# Patient Record
Sex: MALE | Race: NATIVE HAWAIIAN OR OTHER PACIFIC ISLANDER
[De-identification: names, ages, dates, MRNs, and addresses within clinical notes are randomized per-mention and may not be internally consistent; named-entity substitution may affect disease eponyms.]

---

## 2018-03-04 ENCOUNTER — HOSPITAL ENCOUNTER (OUTPATIENT)
Dept: HOSPITAL 17 - NEPC | Age: 53
Setting detail: OBSERVATION
LOS: 1 days | Discharge: HOME | End: 2018-03-05
Attending: HOSPITALIST | Admitting: HOSPITALIST
Payer: MEDICAID

## 2018-03-04 VITALS
HEART RATE: 77 BPM | RESPIRATION RATE: 18 BRPM | OXYGEN SATURATION: 99 % | TEMPERATURE: 98.3 F | DIASTOLIC BLOOD PRESSURE: 74 MMHG | SYSTOLIC BLOOD PRESSURE: 117 MMHG

## 2018-03-04 VITALS — HEIGHT: 70 IN | BODY MASS INDEX: 31.56 KG/M2 | WEIGHT: 220.46 LBS

## 2018-03-04 VITALS
SYSTOLIC BLOOD PRESSURE: 123 MMHG | OXYGEN SATURATION: 99 % | DIASTOLIC BLOOD PRESSURE: 72 MMHG | HEART RATE: 112 BPM | RESPIRATION RATE: 20 BRPM

## 2018-03-04 VITALS
OXYGEN SATURATION: 100 % | RESPIRATION RATE: 17 BRPM | TEMPERATURE: 98.9 F | SYSTOLIC BLOOD PRESSURE: 121 MMHG | HEART RATE: 131 BPM | DIASTOLIC BLOOD PRESSURE: 69 MMHG

## 2018-03-04 VITALS
DIASTOLIC BLOOD PRESSURE: 72 MMHG | OXYGEN SATURATION: 98 % | HEART RATE: 102 BPM | RESPIRATION RATE: 18 BRPM | SYSTOLIC BLOOD PRESSURE: 113 MMHG

## 2018-03-04 DIAGNOSIS — K29.70: Primary | ICD-10-CM

## 2018-03-04 DIAGNOSIS — E86.1: ICD-10-CM

## 2018-03-04 DIAGNOSIS — K57.90: ICD-10-CM

## 2018-03-04 DIAGNOSIS — N28.1: ICD-10-CM

## 2018-03-04 DIAGNOSIS — R42: ICD-10-CM

## 2018-03-04 DIAGNOSIS — D72.829: ICD-10-CM

## 2018-03-04 DIAGNOSIS — D64.9: ICD-10-CM

## 2018-03-04 DIAGNOSIS — R94.31: ICD-10-CM

## 2018-03-04 DIAGNOSIS — Z87.11: ICD-10-CM

## 2018-03-04 DIAGNOSIS — E86.0: ICD-10-CM

## 2018-03-04 LAB
ALBUMIN SERPL-MCNC: 3.5 GM/DL (ref 3.4–5)
ALP SERPL-CCNC: 50 U/L (ref 45–117)
ALT SERPL-CCNC: 17 U/L (ref 12–78)
AST SERPL-CCNC: 14 U/L (ref 15–37)
BASOPHILS # BLD AUTO: 0 TH/MM3 (ref 0–0.2)
BASOPHILS NFR BLD: 0.1 % (ref 0–2)
BILIRUB SERPL-MCNC: 0.2 MG/DL (ref 0.2–1)
BUN SERPL-MCNC: 36 MG/DL (ref 7–18)
CALCIUM SERPL-MCNC: 8.6 MG/DL (ref 8.5–10.1)
CHLORIDE SERPL-SCNC: 109 MEQ/L (ref 98–107)
CREAT SERPL-MCNC: 1.02 MG/DL (ref 0.6–1.3)
EOSINOPHIL # BLD: 0 TH/MM3 (ref 0–0.4)
EOSINOPHIL NFR BLD: 0.1 % (ref 0–4)
ERYTHROCYTE [DISTWIDTH] IN BLOOD BY AUTOMATED COUNT: 12.7 % (ref 11.6–17.2)
GFR SERPLBLD BASED ON 1.73 SQ M-ARVRAT: 76 ML/MIN (ref 89–?)
GLUCOSE SERPL-MCNC: 174 MG/DL (ref 74–106)
HCO3 BLD-SCNC: 28.2 MEQ/L (ref 21–32)
HCT VFR BLD CALC: 34 % (ref 39–51)
HGB BLD-MCNC: 11.5 GM/DL (ref 13–17)
INR PPP: 1 RATIO
LYMPHOCYTES # BLD AUTO: 3.3 TH/MM3 (ref 1–4.8)
LYMPHOCYTES NFR BLD AUTO: 14 % (ref 9–44)
MCH RBC QN AUTO: 29.8 PG (ref 27–34)
MCHC RBC AUTO-ENTMCNC: 33.9 % (ref 32–36)
MCV RBC AUTO: 87.8 FL (ref 80–100)
MONOCYTE #: 0.9 TH/MM3 (ref 0–0.9)
MONOCYTES NFR BLD: 3.9 % (ref 0–8)
NEUTROPHILS # BLD AUTO: 19.2 TH/MM3 (ref 1.8–7.7)
NEUTROPHILS NFR BLD AUTO: 81.9 % (ref 16–70)
PLATELET # BLD: 333 TH/MM3 (ref 150–450)
PMV BLD AUTO: 9 FL (ref 7–11)
PROT SERPL-MCNC: 6.9 GM/DL (ref 6.4–8.2)
PROTHROMBIN TIME: 10.3 SEC (ref 9.8–11.6)
RBC # BLD AUTO: 3.87 MIL/MM3 (ref 4.5–5.9)
SODIUM SERPL-SCNC: 142 MEQ/L (ref 136–145)
TROPONIN I SERPL-MCNC: (no result) NG/ML (ref 0.02–0.05)
WBC # BLD AUTO: 23.4 TH/MM3 (ref 4–11)

## 2018-03-04 PROCEDURE — 96365 THER/PROPH/DIAG IV INF INIT: CPT

## 2018-03-04 PROCEDURE — 88305 TISSUE EXAM BY PATHOLOGIST: CPT

## 2018-03-04 PROCEDURE — 96366 THER/PROPH/DIAG IV INF ADDON: CPT

## 2018-03-04 PROCEDURE — 86850 RBC ANTIBODY SCREEN: CPT

## 2018-03-04 PROCEDURE — 85025 COMPLETE CBC W/AUTO DIFF WBC: CPT

## 2018-03-04 PROCEDURE — 96361 HYDRATE IV INFUSION ADD-ON: CPT

## 2018-03-04 PROCEDURE — 93005 ELECTROCARDIOGRAM TRACING: CPT

## 2018-03-04 PROCEDURE — 88312 SPECIAL STAINS GROUP 1: CPT

## 2018-03-04 PROCEDURE — 80053 COMPREHEN METABOLIC PANEL: CPT

## 2018-03-04 PROCEDURE — 74177 CT ABD & PELVIS W/CONTRAST: CPT

## 2018-03-04 PROCEDURE — 84484 ASSAY OF TROPONIN QUANT: CPT

## 2018-03-04 PROCEDURE — 87086 URINE CULTURE/COLONY COUNT: CPT

## 2018-03-04 PROCEDURE — 81001 URINALYSIS AUTO W/SCOPE: CPT

## 2018-03-04 PROCEDURE — 86900 BLOOD TYPING SEROLOGIC ABO: CPT

## 2018-03-04 PROCEDURE — 83690 ASSAY OF LIPASE: CPT

## 2018-03-04 PROCEDURE — 85730 THROMBOPLASTIN TIME PARTIAL: CPT

## 2018-03-04 PROCEDURE — 71045 X-RAY EXAM CHEST 1 VIEW: CPT

## 2018-03-04 PROCEDURE — C9113 INJ PANTOPRAZOLE SODIUM, VIA: HCPCS

## 2018-03-04 PROCEDURE — 84443 ASSAY THYROID STIM HORMONE: CPT

## 2018-03-04 PROCEDURE — 85610 PROTHROMBIN TIME: CPT

## 2018-03-04 PROCEDURE — G0378 HOSPITAL OBSERVATION PER HR: HCPCS

## 2018-03-04 PROCEDURE — 86901 BLOOD TYPING SEROLOGIC RH(D): CPT

## 2018-03-04 RX ADMIN — PANTOPRAZOLE SODIUM SCH MLS/HR: 40 INJECTION, POWDER, FOR SOLUTION INTRAVENOUS at 18:53

## 2018-03-04 RX ADMIN — PHENYTOIN SODIUM SCH MLS/HR: 50 INJECTION INTRAMUSCULAR; INTRAVENOUS at 20:53

## 2018-03-04 RX ADMIN — STANDARDIZED SENNA CONCENTRATE AND DOCUSATE SODIUM SCH TAB: 8.6; 5 TABLET, FILM COATED ORAL at 21:00

## 2018-03-04 RX ADMIN — Medication SCH ML: at 21:00

## 2018-03-04 NOTE — RADRPT
EXAM DATE/TIME:  03/04/2018 22:05 

 

[facility] COMPARISON:     

No previous studies available for comparison.

 

                     

INDICATIONS :     

Shortness of breath and dizziness.

                     

 

MEDICAL HISTORY :     

None.          

 

SURGICAL HISTORY :     

None.   

 

ENCOUNTER:     

Initial                                        

 

ACUITY:     

1 day      

 

PAIN SCORE:     

0/10

 

LOCATION:     

Bilateral chest 

 

FINDINGS:     

A single view of the chest demonstrates the lungs to be symmetrically aerated without evidence of mas
s, infiltrate or effusion.  The cardiomediastinal contours are unremarkable.  Osseous structures are 
intact.

 

CONCLUSION:     No acute disease.  

 

 

 

 Sabino Rinaldi MD on March 04, 2018 at 22:37           

Board Certified Radiologist.

 This report was verified electronically.

## 2018-03-04 NOTE — HHI.HP
__________________________________________________





HPI


Service


[facility]


Primary Care Physician


No Primary Care Physician


Admission Diagnosis





GI Bleed


Diagnoses:  


(1) GI bleed


Diagnosis:  Principal





(2) Dizziness


Diagnosis:  Principal





(3) Leukocytosis


Diagnosis:  Principal





Travel History


International Travel<30 Days:  Yes


Contact w/Intl Traveler <30 Da:  Yes


Name of Country Traveled to:  [facility]


Traveled to Known Affected Are:  No


History of Present Illness


This is a 52-year-old male with no reported PMH of present the ER with 

complaints of dizziness and black/tarry stools x2 days.  States dizziness worse 

after sitting up or with ambulation.  Notes black stool intermittently.  States 

had similar symptoms approx 2mo ago however black stool resolved on its own, 

did not seek medical attention.  Admits to taking ASA intermittently, no Pepto.

  On arrival, /69, , O2 sat 100% on RA, Afebrile.  Currently /

72, .  WBC 23.4.  Hgb 11.5.  BUN 36, GFR 76.  Troponin negative.  INR 

1.0.  CT Abdomen/Pelvis with no acute findings.  Hemoccult + on exam, started 

on Protonix gtt.





Review of Systems


Except as stated in HPI:  all other systems reviewed are Neg


ROS: 14 point review of systems otherwise negative.





Past Family Social History


Past Medical History


PMH:  None


Past Surgical History


PAST SURGICAL HISTORY:  None


Allergies:  


Coded Allergies:  


     guaifenesin (Unverified  Allergy, Severe, Rash, 3/4/18)


Family History


PAST FAMILY HISTORY:  Reviewed, positive for DM.


Social History


PAST SOCIAL HISTORY:  Negative for alcohol, tobacco or drugs.





Physical Exam


Vital Signs





Vital Signs








  Date Time  Temp Pulse Resp B/P (MAP) Pulse Ox O2 Delivery O2 Flow Rate FiO2


 


3/4/18 20:00  102 18 113/72 (86) 98 Room Air  


 


3/4/18 18:18  112 20 123/72 (89) 99 Room Air  


 


3/4/18 17:52 98.9 131 17 121/69 (86) 100   








Physical Exam


PE:


GENERAL: Very pleasant middle-aged male in no acute distress.  Wife at bedside.


HEENT: PERRLA, EOMI. No scleral icterus or conjunctival pallor. No lid lag or 

facial droop.  


CARDIOVASCULAR: Regular rate and rhythm.  No obvious murmurs to auscultation. 

No chest tenderness to palpation. 


RESPIRATORY: No obvious rhonchi or wheezing. Clear to auscultation. Breath 

sounds equal bilaterally. 


GASTROINTESTINAL: Abdomen soft, non-tender, nondistended. BS normal. 


MUSCULOSKELETAL: Extremities without clubbing, cyanosis, or edema. No obvious 

deformities. 


NEUROLOGICAL: Awake, alert and oriented x4. No focal neurologic deficits. 

Moving both upper and lower extremities spontaneously.


Laboratory





Laboratory Tests








Test


  3/4/18


18:40


 


White Blood Count 23.4 


 


Red Blood Count 3.87 


 


Hemoglobin 11.5 


 


Hematocrit 34.0 


 


Mean Corpuscular Volume 87.8 


 


Mean Corpuscular Hemoglobin 29.8 


 


Mean Corpuscular Hemoglobin


Concent 33.9 


 


 


Red Cell Distribution Width 12.7 


 


Platelet Count 333 


 


Mean Platelet Volume 9.0 


 


Neutrophils (%) (Auto) 81.9 


 


Lymphocytes (%) (Auto) 14.0 


 


Monocytes (%) (Auto) 3.9 


 


Eosinophils (%) (Auto) 0.1 


 


Basophils (%) (Auto) 0.1 


 


Neutrophils # (Auto) 19.2 


 


Lymphocytes # (Auto) 3.3 


 


Monocytes # (Auto) 0.9 


 


Eosinophils # (Auto) 0.0 


 


Basophils # (Auto) 0.0 


 


CBC Comment DIFF FINAL 


 


Differential Comment  


 


Prothrombin Time 10.3 


 


Prothromb Time International


Ratio 1.0 


 


 


Activated Partial


Thromboplast Time 21.8 


 


 


Blood Urea Nitrogen 36 


 


Creatinine 1.02 


 


Random Glucose 174 


 


Total Protein 6.9 


 


Albumin 3.5 


 


Calcium Level 8.6 


 


Alkaline Phosphatase 50 


 


Aspartate Amino Transf


(AST/SGOT) 14 


 


 


Alanine Aminotransferase


(ALT/SGPT) 17 


 


 


Total Bilirubin 0.2 


 


Sodium Level 142 


 


Potassium Level 4.1 


 


Chloride Level 109 


 


Carbon Dioxide Level 28.2 


 


Anion Gap 5 


 


Estimat Glomerular Filtration


Rate 76 


 


 


Troponin I LESS THAN 0.02 


 


Lipase 177 


 


Thyroid Stimulating Hormone


3rd Gen 1.340 


 








Result Diagram:  


3/4/18 1840                                                                    

            3/4/18 1840








Caprini VTE Risk Assessment


Caprini VTE Risk Assessment:  No/Low Risk (score <= 1)


VTE Pharm Contraindication:  Active bleeding


Caprini Risk Assessment Model











 Point Value = 1          Point Value = 2  Point Value = 3  Point Value = 5


 


Age 41-60


Minor surgery


BMI > 25 kg/m2


Swollen legs


Varicose veins


Pregnancy or postpartum


History of unexplained or recurrent


   spontaneous 


Oral contraceptives or hormone


   replacement


Sepsis (< 1 month)


Serious lung disease, including


   pneumonia (< 1 month)


Abnormal pulmonary function


Acute myocardial infarction


Congestive heart failure (< 1 month)


History of inflammatory bowel disease


Medical patient at bed rest Age 61-74


Arthroscopic surgery


Major open surgery (> 45 min)


Laparoscopic surgery (> 45 min)


Malignancy


Confined to bed (> 72 hours)


Immobilizing plaster cast


Central venous access Age >= 75


History of VTE


Family history of VTE


Factor V Leiden


Prothrombin 97921S


Lupus anticoagulant


Anticardiolipin antibodies


Elevated serum homocysteine


Heparin-induced thrombocytopenia


Other congenital or acquired


   thrombophilia Stroke (< 1 month)


Elective arthroplasty


Hip, pelvis, or leg fracture


Acute spinal cord injury (< 1 month)








Prophylaxis Regimen











   Total Risk


Factor Score Risk Level Prophylaxis Regimen


 


0-1      Low Early ambulation


 


2 Moderate Order ONE of the following:


*Sequential Compression Device (SCD)


*Heparin 5000 units SQ BID


 


3-4 Higher Order ONE of the following medications:


*Heparin 5000 units SQ TID


*Enoxaparin/Lovenox 40 mg SQ daily (WT < 150 kg, CrCl > 30 mL/min)


*Enoxaparin/Lovenox 30 mg SQ daily (WT < 150 kg, CrCl > 10-29 mL/min)


*Enoxaparin/Lovenox 30 mg SQ BID (WT < 150 kg, CrCl > 30 mL/min)


AND/OR


*Sequential Compression Device (SCD)


 


5 or more Highest Order ONE of the following medications:


*Heparin 5000 units SQ TID (Preferred with Epidurals)


*Enoxaparin/Lovenox 40 mg SQ daily (WT < 150 kg, CrCl > 30 mL/min)


*Enoxaparin/Lovenox 30 mg SQ daily (WT < 150 kg, CrCl > 10-29 mL/min)


*Enoxaparin/Lovenox 30 mg SQ BID (WT < 150 kg, CrCl > 30 mL/min)


AND


*Sequential Compression Device (SCD)











Assessment and Plan


Problem List:  


(1) GI bleed


ICD Code:  K92.2 - Gastrointestinal hemorrhage, unspecified


Status:  Acute


(2) Dizziness


ICD Code:  R42 - Dizziness and giddiness


(3) Leukocytosis


ICD Code:  D72.829 - Elevated white blood cell count, unspecified


Assessment and Plan


A/P:


1.  GI Bleed:  black/tarry stool x2 days, similar episode 1mo ago resolved 

spontaneously, +Hemoccult.  Hgb stable at 11.5.  Currently on Protonix gtt, 

will continue.  Consult GI for further evaluation, likely EGD.  Check repeat Hgb

/Hct in am for trend, transfuse as needed for Hgb <7


2.  Leukocytosis:  WBC 23.4 w/ elevated neutrophil count.  Check CXR and U/a to 

eval for underlying infection.  CT Abd/Pelvis w/ no acute findings, images 

reviewed by me. 


3.  Dizziness:  Likely related to hypovolemia from GI bleed/dehydration, BUN 36

, GFR 76.  IVF for hydration, repeat labs in am. 


4.  DVT Prophylaxis:  Pharmacologic contraindication in light of acute bleed. 


5.  Social work for d/c planning as needed. 


6.  Case discussed w/ ER physician at length, labs/records/imaging reviewed by 

me.





Problem Qualifiers





(1) GI bleed:  


Qualified Codes:  K92.1 - Anjali Maloney MD Mar 4, 2018 20:55

## 2018-03-04 NOTE — RADRPT
EXAM DATE/TIME:  03/04/2018 20:23 

 

[facility] COMPARISON:     

No previous studies available for comparison.

 

 

INDICATIONS :     

Mid lower abdominal pain and bloody stools.

                      

 

IV CONTRAST:     

96 cc Omnipaque 350 (iohexol) IV 

 

 

ORAL CONTRAST:      

No oral contrast ingested.

                      

 

RADIATION DOSE:     

13.16 CTDIvol (mGy) 

 

 

MEDICAL HISTORY :     

None  

 

SURGICAL HISTORY :      

None. 

 

ENCOUNTER:      

Initial

 

ACUITY:      

1 week

 

PAIN SCALE:      

6/10

 

LOCATION:         

abdomen

 

TECHNIQUE:     

Volumetric scanning of the abdomen and pelvis was performed.  Using automated exposure control and ad
justment of the mA and/or kV according to patient size, radiation dose was kept as low as reasonably 
achievable to obtain optimal diagnostic quality images.  DICOM format image data is available electro
nically for review and comparison.  

 

FINDINGS:     

Lung bases clear except for minimal atelectasis.

 

No acute findings in the liver, spleen, adrenals, kidneys or pancreas. Small right renal cysts.

 

No gallstones of very ductal dilatation.

 

No free fluid. No bowel obstruction. No adenopathy.

 

There are colonic diverticula without evidence for diverticulitis.

 

CONCLUSION:     

1. No acute findings within the abdomen or pelvis. Small right renal cysts. Colonic diverticula witho
ut evidence for diverticulitis.

 

 

 

 Sabino Rinaldi MD on March 04, 2018 at 20:47           

Board Certified Radiologist.

 This report was verified electronically.

## 2018-03-04 NOTE — HHI.PR
Subjective


Remarks


NOT SEEN





Objective


Vitals





Vital Signs








  Date Time  Temp Pulse Resp B/P (MAP) Pulse Ox O2 Delivery O2 Flow Rate FiO2


 


3/4/18 22:14        


 


3/4/18 22:14        


 


3/4/18 20:00  102 18 113/72 (86) 98 Room Air  


 


3/4/18 18:18  112 20 123/72 (89) 99 Room Air  


 


3/4/18 17:52 98.9 131 17 121/69 (86) 100   














I/O      


 


 3/3/18 3/3/18 3/3/18 3/4/18 3/4/18 3/4/18





 07:00 15:00 23:00 07:00 15:00 23:00


 


Intake Total      35 ml


 


Balance      35 ml


 


      


 


Intake IV Total      35 ml








Result Diagram:  


3/4/18 1840                                                                    

            3/4/18 1840





Imaging





Last Impressions








Abdomen/Pelvis CT 3/4/18 2011 Draft





Impressions: 





 Service Date/Time:  Sunday, March 4, 2018 20:23 - CONCLUSION:  1. No acute 





 findings within the abdomen or pelvis. Small right renal cysts. Colonic 





 diverticula without evidence for diverticulitis.     Sabino Rinaldi MD 








Objective Remarks


GENERAL: Very pleasant middle-aged male in no acute distress.  Wife at bedside.


HEENT: PERRLA, EOMI. No scleral icterus or conjunctival pallor. No lid lag or 

facial droop.  


CARDIOVASCULAR: Regular rate and rhythm.  No obvious murmurs to auscultation. 

No chest tenderness to palpation. 


RESPIRATORY: No obvious rhonchi or wheezing. Clear to auscultation. Breath 

sounds equal bilaterally. 


GASTROINTESTINAL: Abdomen soft, non-tender, nondistended. BS normal. 


MUSCULOSKELETAL: Extremities without clubbing, cyanosis, or edema. No obvious 

deformities. 


NEUROLOGICAL: Awake, alert and oriented x4. No focal neurologic deficits. 

Moving both upper and lower extremities spontaneously.





A/P


Problem List:  


(1) GI bleed


ICD Code:  K92.2 - Gastrointestinal hemorrhage, unspecified


Status:  Acute


(2) Dizziness


ICD Code:  R42 - Dizziness and giddiness


(3) Leukocytosis


ICD Code:  D72.829 - Elevated white blood cell count, unspecified


Assessment and Plan


1.  GI Bleed:  black/tarry stool x2 days, similar episode 1mo ago resolved 

spontaneously, +Hemoccult.  Hgb stable at 11.5.  Currently on Protonix gtt, 

will continue.  Consult GI for further evaluation, likely EGD.  Check repeat Hgb

/Hct in am for trend, transfuse as needed for Hgb <7


2.  Leukocytosis:  WBC 23.4 w/ elevated neutrophil count.  Check CXR and U/a to 

eval for underlying infection.  CT Abd/Pelvis w/ no acute findings, images 

reviewed by me. 


3.  Dizziness:  Likely related to hypovolemia from GI bleed/dehydration, BUN 36

, GFR 76.  IVF for hydration, repeat labs in am. 


4.  DVT Prophylaxis:  Pharmacologic contraindication in light of acute bleed.





Problem Qualifiers





(1) GI bleed:  


Qualified Codes:  K92.1 - Melena








Kota Barfield MD Mar 4, 2018 22:38

## 2018-03-04 NOTE — PD
HPI


Chief Complaint:  Dizziness


Time Seen by Provider:  18:15


Travel History


International Travel<30 days:  Yes


Contact w/Intl Traveler<30days:  Yes


Name of Country Traveled to:  [place]


Traveled to known affect area:  No





History of Present Illness


HPI


51yo M with no PMH presents to the ED with c/o dizziness and black stool for 2 

days.  Said it feels like the room is spinning but does not notice if it is 

worst with head movement.  Denies any fever, chest pain, sob, n/v, abdominal 

pain, focal weakness or numbness.  Pt has generalized fatigue since yesterday.  

Pt said his 4 year old jumped on his abdomen 1 week ago and it hurt for a 

little but not anymore and wasnt sure if this is relevant.  Pt has had black 

stool a few months ago but resolve after 3-4 days so he did not seek medical 

attention.  Pt has not had endoscopy or colonoscopy before.





PFSH


Social History


Tobacco Use:  No





Allergies-Medications


(Allergen,Severity, Reaction):  


Coded Allergies:  


     guaifenesin (Unverified  Allergy, Severe, Rash, 3/4/18)


Reported Meds & Prescriptions





Reported Meds & Active Scripts


Active








Review of Systems


Except as stated in HPI:  all other systems reviewed are Neg





Physical Exam


Narrative


GENERAL: 51yo M in mild distress.


SKIN: Focused skin assessment warm/dry.


HEAD: Atraumatic. Normocephalic. 


EYES: Pupils equal and round at 3mm bilaterally.  EOMI.  No nystagmus.


ENT: No nasal bleeding or discharge.  Mucous membranes pink and moist.


NECK: Trachea midline. No JVD. 


CARDIOVASCULAR: Tachycardic at 110s-120bpm.  No murmur appreciated.


RESPIRATORY: No accessory muscle use. Clear to auscultation. Breath sounds 

equal bilaterally. 


GASTROINTESTINAL: Abdomen soft, non-tender, nondistended. No rebound tenderness 

or guarding.


RECTAL: No external hemorrhoids.  +Black stool.  +Hemaprompt.


MUSCULOSKELETAL: No obvious deformities. No clubbing.  No cyanosis.  No edema. 


NEUROLOGICAL: Awake and alert. No obvious cranial nerve deficits.  Motor 

grossly within normal limits in all extremities.  Sensation intact.   Normal 

speech.


PSYCHIATRIC: Appropriate mood and affect; insight and judgment normal.





Data


Data


Last Documented VS





Vital Signs








  Date Time  Temp Pulse Resp B/P (MAP) Pulse Ox O2 Delivery O2 Flow Rate FiO2


 


3/4/18 20:00  102 18 113/72 (86) 98 Room Air  


 


3/4/18 17:52 98.9       








Orders





 Orders


Electrocardiogram (3/4/18 )


Complete Blood Count With Diff (3/4/18 18:25)


Comprehensive Metabolic Panel (3/4/18 18:25)


Lipase (3/4/18 18:25)


Prothrombin Time / Inr (Pt) (3/4/18 18:25)


Act Partial Throm Time (Ptt) (3/4/18 18:25)


Type And Screen (3/4/18 18:25)


Sodium Chloride 0.9... W/Pantoprazole In (3/4/18 18:25)


Sodium Chloride 0.9... W/Pantoprazole In (3/4/18 18:25)


Troponin I (3/4/18 18:25)


Thyroid Stimulating Hormone (3/4/18 18:25)


Ct Abd/Pel W Iv Contrast(Rout) (3/4/18 20:11)


Iohexol 350 Inj (Omnipaque 350 Inj) (3/4/18 20:32)


Admit Order (Ed Use Only) (3/4/18 20:49)





Labs





Laboratory Tests








Test


  3/4/18


18:40


 


White Blood Count 23.4 TH/MM3 


 


Red Blood Count 3.87 MIL/MM3 


 


Hemoglobin 11.5 GM/DL 


 


Hematocrit 34.0 % 


 


Mean Corpuscular Volume 87.8 FL 


 


Mean Corpuscular Hemoglobin 29.8 PG 


 


Mean Corpuscular Hemoglobin


Concent 33.9 % 


 


 


Red Cell Distribution Width 12.7 % 


 


Platelet Count 333 TH/MM3 


 


Mean Platelet Volume 9.0 FL 


 


Neutrophils (%) (Auto) 81.9 % 


 


Lymphocytes (%) (Auto) 14.0 % 


 


Monocytes (%) (Auto) 3.9 % 


 


Eosinophils (%) (Auto) 0.1 % 


 


Basophils (%) (Auto) 0.1 % 


 


Neutrophils # (Auto) 19.2 TH/MM3 


 


Lymphocytes # (Auto) 3.3 TH/MM3 


 


Monocytes # (Auto) 0.9 TH/MM3 


 


Eosinophils # (Auto) 0.0 TH/MM3 


 


Basophils # (Auto) 0.0 TH/MM3 


 


CBC Comment DIFF FINAL 


 


Differential Comment  


 


Prothrombin Time 10.3 SEC 


 


Prothromb Time International


Ratio 1.0 RATIO 


 


 


Activated Partial


Thromboplast Time 21.8 SEC 


 


 


Blood Urea Nitrogen 36 MG/DL 


 


Creatinine 1.02 MG/DL 


 


Random Glucose 174 MG/DL 


 


Total Protein 6.9 GM/DL 


 


Albumin 3.5 GM/DL 


 


Calcium Level 8.6 MG/DL 


 


Alkaline Phosphatase 50 U/L 


 


Aspartate Amino Transf


(AST/SGOT) 14 U/L 


 


 


Alanine Aminotransferase


(ALT/SGPT) 17 U/L 


 


 


Total Bilirubin 0.2 MG/DL 


 


Sodium Level 142 MEQ/L 


 


Potassium Level 4.1 MEQ/L 


 


Chloride Level 109 MEQ/L 


 


Carbon Dioxide Level 28.2 MEQ/L 


 


Anion Gap 5 MEQ/L 


 


Estimat Glomerular Filtration


Rate 76 ML/MIN 


 


 


Troponin I


  LESS THAN 0.02


NG/ML


 


Lipase 177 U/L 


 


Thyroid Stimulating Hormone


3rd Gen 1.340 uIU/ML 


 











MDM


Medical Decision Making


Medical Screen Exam Complete:  Yes


Emergency Medical Condition:  Yes


Interpretation(s)


EKG: Sinus tachycardia at 119bpm.  LAD. No significant ST segment elevation.


Differential Diagnosis


Malignancy vs. upper GI bleed vs. gastritis vs. symptomatic anemia vs. vertigo


Narrative Course


51yo M with generalized fatigue, black stool and dizziness for 2 days.  Pt is 

tachycardic 110s-120s.  Blood pressure is normal at 123/72.  Will obtain labs 

including CBC and type and screen.  Pt given protonix bolus and drip.  Pt seen 

at end of my shift and sign out to next team to follow up labs and reevaluate.





[facility] Point of Care


Internal Pos. & Neg. Controls:  Passed


Fecal Specimen Occult Blood:  Positive





Diagnosis





 Primary Impression:  


 GI bleed


 Qualified Codes:  K92.1 - Melena


Scripts


Pantoprazole (Protonix) 40 Mg Tab


40 MG PO DAILY for Gastritis for 30 Days, #30 TAB 0 Refills


   Prov: Jeremy Paris         3/5/18











Kaykay Martinez DO Mar 4, 2018 18:37

## 2018-03-04 NOTE — PD
Physical Exam


Narrative


General: 


The patient is a well-developed well-nourished male in no acute distress. 





Head and Neck exam: 


Head is normocephalic atraumatic. 


Eyes: EOMI, pupils are equal round and reactive to light. 


Nose: Midline septum with pink mucous membranes 


Mouth: Dentition unremarkable. Moist mucus membranes. Posterior oropharynx is 

not erythematous. No tonsillar hypertrophy. Uvula midline. Airway patent. 


Neck: No palpable lymphadenopathy. No nuchal rigidity. No thyromegaly. 





Cardiovascular: 


Sinus tachycardia in the low 100 without murmurs, gallops, or rubs. No pulse 

deficit to the extremities on simultaneous auscultation and palpation of his 

radial art. 





Lungs: 


Clear to auscultation bilaterally. No wheezes, rhonchi, or rales.


 


Abdomen:


Soft, with minimal discomfort on deep palpation left lower quadrant of the 

abdomen, no other tenderness on palpation of the other quadrants of the 

abdomen. No guarding, rebound, or rigidity.  Normal bowel sounds are audible.  

No tenderness on palpation of McBurney's point.  Negative Nolasco sign.





Extremities: 


No clubbing, cyanosis, or edema. 2+ pulses in all 4 extremities.  No calf 

tenderness on palpation.





Back: 


No costovertebral angle tenderness to palpation. 





Neurologic Exam: Grossly nonfocal.





Skin Exam: No rash noted. Intact skin that is warm and dry.





Data


Data


Last Documented VS





Vital Signs








  Date Time  Temp Pulse Resp B/P (MAP) Pulse Ox O2 Delivery O2 Flow Rate FiO2


 


3/4/18 20:00  102 18 113/72 (86) 98 Room Air  


 


3/4/18 17:52 98.9       








Orders





 Orders


Electrocardiogram (3/4/18 )


Complete Blood Count With Diff (3/4/18 18:25)


Comprehensive Metabolic Panel (3/4/18 18:25)


Lipase (3/4/18 18:25)


Prothrombin Time / Inr (Pt) (3/4/18 18:25)


Act Partial Throm Time (Ptt) (3/4/18 18:25)


Type And Screen (3/4/18 18:25)


Sodium Chloride 0.9... W/Pantoprazole In (3/4/18 18:25)


Sodium Chloride 0.9... W/Pantoprazole In (3/4/18 18:25)


Troponin I (3/4/18 18:25)


Thyroid Stimulating Hormone (3/4/18 18:25)


Ct Abd/Pel W Iv Contrast(Rout) (3/4/18 20:11)


Iohexol 350 Inj (Omnipaque 350 Inj) (3/4/18 20:32)


Admit Order (Ed Use Only) (3/4/18 20:49)





Labs





Laboratory Tests








Test


  3/4/18


18:40


 


White Blood Count 23.4 TH/MM3 


 


Red Blood Count 3.87 MIL/MM3 


 


Hemoglobin 11.5 GM/DL 


 


Hematocrit 34.0 % 


 


Mean Corpuscular Volume 87.8 FL 


 


Mean Corpuscular Hemoglobin 29.8 PG 


 


Mean Corpuscular Hemoglobin


Concent 33.9 % 


 


 


Red Cell Distribution Width 12.7 % 


 


Platelet Count 333 TH/MM3 


 


Mean Platelet Volume 9.0 FL 


 


Neutrophils (%) (Auto) 81.9 % 


 


Lymphocytes (%) (Auto) 14.0 % 


 


Monocytes (%) (Auto) 3.9 % 


 


Eosinophils (%) (Auto) 0.1 % 


 


Basophils (%) (Auto) 0.1 % 


 


Neutrophils # (Auto) 19.2 TH/MM3 


 


Lymphocytes # (Auto) 3.3 TH/MM3 


 


Monocytes # (Auto) 0.9 TH/MM3 


 


Eosinophils # (Auto) 0.0 TH/MM3 


 


Basophils # (Auto) 0.0 TH/MM3 


 


CBC Comment DIFF FINAL 


 


Differential Comment  


 


Prothrombin Time 10.3 SEC 


 


Prothromb Time International


Ratio 1.0 RATIO 


 


 


Activated Partial


Thromboplast Time 21.8 SEC 


 


 


Blood Urea Nitrogen 36 MG/DL 


 


Creatinine 1.02 MG/DL 


 


Random Glucose 174 MG/DL 


 


Total Protein 6.9 GM/DL 


 


Albumin 3.5 GM/DL 


 


Calcium Level 8.6 MG/DL 


 


Alkaline Phosphatase 50 U/L 


 


Aspartate Amino Transf


(AST/SGOT) 14 U/L 


 


 


Alanine Aminotransferase


(ALT/SGPT) 17 U/L 


 


 


Total Bilirubin 0.2 MG/DL 


 


Sodium Level 142 MEQ/L 


 


Potassium Level 4.1 MEQ/L 


 


Chloride Level 109 MEQ/L 


 


Carbon Dioxide Level 28.2 MEQ/L 


 


Anion Gap 5 MEQ/L 


 


Estimat Glomerular Filtration


Rate 76 ML/MIN 


 


 


Troponin I


  LESS THAN 0.02


NG/ML


 


Lipase 177 U/L 


 


Thyroid Stimulating Hormone


3rd Gen 1.340 uIU/ML 


 











MDM


Medical Record Reviewed:  Yes


Supervised Visit with SHENA:  No


Interpretation(s)





Last Impressions








Abdomen/Pelvis CT 3/4/18 2011 Draft





Impressions: 





 Service Date/Time:  Sunday, March 4, 2018 20:23 - CONCLUSION:  1. No acute 





 findings within the abdomen or pelvis. Small right renal cysts. Colonic 





 diverticula without evidence for diverticulitis.     Sabino Rinaldi MD 








Narrative Course


During the course of the patient's emergency department visit, the patient's 

history, examination, and differential diagnosis were reviewed with the 

patient. The patient was placed on a cardiac monitor with oximetry and frequent 

blood pressure monitoring. The patient had  IV access obtained and blood work 

sent for analysis.  The patient was initially evaluated by Dr. Martinez.  Please see 

her complete history and physical.  The patient's case was checked out to me at 

the conclusion of her shift.





The patient was initially provided Protonix 80 mg IV followed by a Protonix 

drip.





The patient's laboratory studies were reviewed and remarkable for a white count 

of 23.8, hemoglobin 11.5, platelets 333 with a neutrophil 81.9, CMP is 

remarkable for chloride of 109, BUN 36, GFR 76, glucose 174, AST 14, troponin 

is less than 0.02, lipase 177, TSH 1.34, PT 10.3, PTT 21.8.





Due to the patient's leukocytosis with the GI bleed, CT scan of the abdomen and 

pelvis was ordered to further evaluate for possible underlying colitis versus 

diverticulitis, versus diverticulosis.





Radiology studies were reviewed and remarkable for a CT scan of the abdomen and 

pelvis that shows no acute abnormality.  Diverticulosis is noted.  No evidence 

of diverticulitis, renal cyst noted





The patient's results were discussed with the patient, including the plan of 

care. I explained that further testing and/ or monitoring is indicated based on 

the patient's history, examination, and/ or laboratory findings. Therefore, I 

recommended admission for additional evaluation. The patient expressed 

understanding and was agreeable with this plan. The patient was admitted to the 

hospital in stable condition and sent to a bed [facility].


Physician Communication


Physician Communication


The patient's case including history, pertinent physical examination findings, 

and laboratory studies were discussed with Dr. Bell. It was agreed that the 

patient would be admitted to the [facility].





Diagnosis





 Primary Impression:  


 GI bleed


 Qualified Codes:  K92.1 - Melena





Admitting Information


Admitting Physician Requests:  Admit


Scripts


No Active Prescriptions or Reported Meds











Ale Hudson MD Mar 4, 2018 19:17

## 2018-03-05 VITALS
OXYGEN SATURATION: 97 % | DIASTOLIC BLOOD PRESSURE: 73 MMHG | SYSTOLIC BLOOD PRESSURE: 121 MMHG | RESPIRATION RATE: 20 BRPM | HEART RATE: 89 BPM | TEMPERATURE: 98.3 F

## 2018-03-05 VITALS
TEMPERATURE: 97.6 F | DIASTOLIC BLOOD PRESSURE: 70 MMHG | OXYGEN SATURATION: 95 % | SYSTOLIC BLOOD PRESSURE: 113 MMHG | RESPIRATION RATE: 20 BRPM | HEART RATE: 90 BPM

## 2018-03-05 VITALS
DIASTOLIC BLOOD PRESSURE: 66 MMHG | RESPIRATION RATE: 17 BRPM | SYSTOLIC BLOOD PRESSURE: 110 MMHG | OXYGEN SATURATION: 95 % | TEMPERATURE: 98.5 F | HEART RATE: 91 BPM

## 2018-03-05 VITALS — HEART RATE: 82 BPM

## 2018-03-05 VITALS — HEART RATE: 88 BPM

## 2018-03-05 LAB
ALBUMIN SERPL-MCNC: 3.4 GM/DL (ref 3.4–5)
ALP SERPL-CCNC: 48 U/L (ref 45–117)
ALT SERPL-CCNC: 19 U/L (ref 12–78)
AST SERPL-CCNC: 18 U/L (ref 15–37)
BACTERIA #/AREA URNS HPF: (no result) /HPF
BASOPHILS # BLD AUTO: 0 TH/MM3 (ref 0–0.2)
BASOPHILS NFR BLD: 0.3 % (ref 0–2)
BILIRUB SERPL-MCNC: 0.5 MG/DL (ref 0.2–1)
BUN SERPL-MCNC: 28 MG/DL (ref 7–18)
CALCIUM SERPL-MCNC: 8 MG/DL (ref 8.5–10.1)
CHLORIDE SERPL-SCNC: 110 MEQ/L (ref 98–107)
COLOR UR: YELLOW
CREAT SERPL-MCNC: 0.98 MG/DL (ref 0.6–1.3)
EOSINOPHIL # BLD: 0.2 TH/MM3 (ref 0–0.4)
EOSINOPHIL NFR BLD: 1.4 % (ref 0–4)
ERYTHROCYTE [DISTWIDTH] IN BLOOD BY AUTOMATED COUNT: 12.7 % (ref 11.6–17.2)
GFR SERPLBLD BASED ON 1.73 SQ M-ARVRAT: 80 ML/MIN (ref 89–?)
GLUCOSE SERPL-MCNC: 100 MG/DL (ref 74–106)
GLUCOSE UR STRIP-MCNC: (no result) MG/DL
HCO3 BLD-SCNC: 26.5 MEQ/L (ref 21–32)
HCT VFR BLD CALC: 30.2 % (ref 39–51)
HGB BLD-MCNC: 10.2 GM/DL (ref 13–17)
HGB UR QL STRIP: (no result)
KETONES UR STRIP-MCNC: (no result) MG/DL
LYMPHOCYTES # BLD AUTO: 2.9 TH/MM3 (ref 1–4.8)
LYMPHOCYTES NFR BLD AUTO: 20.7 % (ref 9–44)
MCH RBC QN AUTO: 29.8 PG (ref 27–34)
MCHC RBC AUTO-ENTMCNC: 33.8 % (ref 32–36)
MCV RBC AUTO: 88.2 FL (ref 80–100)
MONOCYTE #: 0.8 TH/MM3 (ref 0–0.9)
MONOCYTES NFR BLD: 6.1 % (ref 0–8)
MUCOUS THREADS #/AREA URNS LPF: (no result) /LPF
NEUTROPHILS # BLD AUTO: 9.9 TH/MM3 (ref 1.8–7.7)
NEUTROPHILS NFR BLD AUTO: 71.5 % (ref 16–70)
NITRITE UR QL STRIP: (no result)
PLATELET # BLD: 260 TH/MM3 (ref 150–450)
PMV BLD AUTO: 8.7 FL (ref 7–11)
PROT SERPL-MCNC: 6.3 GM/DL (ref 6.4–8.2)
RBC # BLD AUTO: 3.43 MIL/MM3 (ref 4.5–5.9)
SODIUM SERPL-SCNC: 145 MEQ/L (ref 136–145)
SP GR UR STRIP: 1.04 (ref 1–1.03)
SQUAMOUS #/AREA URNS HPF: <1 /HPF (ref 0–5)
URINE LEUKOCYTE ESTERASE: (no result)
WBC # BLD AUTO: 13.8 TH/MM3 (ref 4–11)

## 2018-03-05 RX ADMIN — PHENYTOIN SODIUM SCH MLS/HR: 50 INJECTION INTRAMUSCULAR; INTRAVENOUS at 07:42

## 2018-03-05 RX ADMIN — STANDARDIZED SENNA CONCENTRATE AND DOCUSATE SODIUM SCH TAB: 8.6; 5 TABLET, FILM COATED ORAL at 08:52

## 2018-03-05 RX ADMIN — Medication SCH ML: at 08:52

## 2018-03-05 RX ADMIN — PANTOPRAZOLE SODIUM SCH MLS/HR: 40 INJECTION, POWDER, FOR SOLUTION INTRAVENOUS at 08:52

## 2018-03-05 NOTE — HHI.PR
Subjective


Remarks


Follow-up GI bleed.  No further bleeding.  He is doing okay denies cough and 

UTI symptoms were discussed with nursing





Objective


Vitals





Vital Signs








  Date Time  Temp Pulse Resp B/P (MAP) Pulse Ox O2 Delivery O2 Flow Rate FiO2


 


3/5/18 11:58 97.8 86 18 108/67 (81) 96   


 


3/5/18 07:34 98.3 89 20 121/73 (89) 97   


 


3/5/18 04:02 98.5 91 17 110/66 (81) 95   


 


3/5/18 04:00  88      


 


3/5/18 00:00  82      


 


3/4/18 22:14 98.3 77 18 117/74 (88) 99   


 


3/4/18 22:14        


 


3/4/18 20:00  102 18 113/72 (86) 98 Room Air  


 


3/4/18 18:18  112 20 123/72 (89) 99 Room Air  


 


3/4/18 17:52 98.9 131 17 121/69 (86) 100   














I/O      


 


 3/4/18 3/4/18 3/4/18 3/5/18 3/5/18 3/5/18





 07:00 15:00 23:00 07:00 15:00 23:00


 


Intake Total   35 ml  300 ml 


 


Balance   35 ml  300 ml 


 


      


 


Intake IV Total   35 ml   


 


Other     300 ml 








Result Diagram:  


3/5/18 0555                                                                    

            3/5/18 0555





Imaging





Last Impressions








Abdomen/Pelvis CT 3/4/18 2011 Signed





Impressions: 





 Service Date/Time:  Sunday, March 4, 2018 20:23 - CONCLUSION:  1. No acute 





 findings within the abdomen or pelvis. Small right renal cysts. Colonic 





 diverticula without evidence for diverticulitis.     Sabino Rinaldi MD 


 


Chest X-Ray 3/4/18 0000 Signed





Impressions: 





 Service Date/Time:  Sunday, March 4, 2018 22:05 - CONCLUSION: No acute 

disease.  





      Sabino Rinaldi MD 








Objective Remarks


GENERAL: Very pleasant middle-aged male in no acute distress.


HEENT: PERRLA, EOMI. No scleral icterus or conjunctival pallor. No lid lag or 

facial droop.  


CARDIOVASCULAR: Regular rate and rhythm.  No obvious murmurs to auscultation. 

No chest tenderness to palpation. 


RESPIRATORY: No obvious rhonchi or wheezing. Clear to auscultation. Breath 

sounds equal bilaterally. 


GASTROINTESTINAL: Abdomen soft, non-tender, nondistended. BS normal. 


MUSCULOSKELETAL: Extremities without clubbing, cyanosis, or edema. No obvious 

deformities. 


NEUROLOGICAL: Awake, alert and oriented x4. No focal neurologic deficits. 

Moving both upper and lower extremities spontaneously.


Procedures


EGD





A/P


Problem List:  


(1) GI bleed


ICD Code:  K92.2 - Gastrointestinal hemorrhage, unspecified


Status:  Acute


(2) Dizziness


ICD Code:  R42 - Dizziness and giddiness


(3) Leukocytosis


ICD Code:  D72.829 - Elevated white blood cell count, unspecified


Assessment and Plan


1.  GI Bleed:  black/tarry stool x2 days, similar episode 1mo ago resolved 

spontaneously, +Hemoccult.  Hgb stable at 11.5.  EGD shows gastritis status 

post biopsy and nonbleeding duodenal ulcer.  Stable continue PPI and follow-up 

biopsy.  


2.  Leukocytosis: Improved.  Unremarkable chest x-ray.  Abnormal urinalysis no 

UTI symptoms.  Follow-up urine culture. CT Abd/Pelvis w/ no acute findings, 

images reviewed by me. 


3.  Dizziness:  Likely related to hypovolemia from GI bleed/dehydration, BUN 36

, GFR 76.  IVF for hydration, improved


4.  DVT Prophylaxis:  Pharmacologic contraindication in light of acute bleed.


Discharge Planning


Discharge patient to home


Condition on discharge: Improved


Regular Diet as tolerated. 


Ad Nellie activity


Rx written: Protonix. Avoid NSAID


Follow-up with primary care physician and GI.





Problem Qualifiers





(1) GI bleed:  


Qualified Codes:  K92.1 - Melena








Kota Barfield MD Mar 5, 2018 12:39

## 2018-03-05 NOTE — PD.CONS
HPI


History of Present Illness


This is a 52 year old male who presented to the ER for dizziness, fatigue, 

black stool.  He has had these symptoms for 5-6 days.  He admits prior episode 

black stool 3 months stool that resolved.  Admits some upper abd pain that is 

worse at night, for which he takes ibuprofen.  He has been taking iburprofen at 

night for last few days.  Denies red blood in stool.  He did vomit yesterday 

and denies seeing blood or coffee grounds.  Never had EGD.  Never had 

colonoscopy. Denies significant heartburn or acid reflux.  Not on blood 

thinners.  Takes occasional ASA. 


 (Grace Frederick)





PFSH


Past Medical History


PMH:  None


Past Surgical History


PAST SURGICAL HISTORY:  None


 (Grace Frederick)


Coded Allergies:  


     guaifenesin (Unverified  Allergy, Severe, Rash, 3/4/18)


Family History


PAST FAMILY HISTORY:  Reviewed, positive for DM.


Social History


PAST SOCIAL HISTORY:  Negative for alcohol, tobacco or drugs.


 (Grace Frederick)





Review of Systems


Constitutional:  COMPLAINS OF: Fatigue, Dizziness, DENIES: Fever


Endocrine:  DENIES: Polydipsia


Eyes:  DENIES: Blurred vision


Ears, nose, mouth, throat:  DENIES: Hearing loss


Respiratory:  DENIES: Cough


Cardiovascular:  DENIES: Chest pain


Gastrointestinal:  COMPLAINS OF: Abdominal pain, Black stools, Nausea, Vomiting

, DENIES: Bloody stools, Hematemesis


Genitourinary:  DENIES: Hematuria


Musculoskeletal:  DENIES: Joint Swelling


Integumentary:  DENIES: Abnormal pigmentation


Hematologic/lymphatic:  DENIES: Bruising


Immunologic/allergic:  DENIES: Eczema


Neurologic:  DENIES: Abnormal gait


Psychiatric:  DENIES: Confusion (Grace Frederick)





GI Exam


Vitals I&O





Vital Signs








  Date Time  Temp Pulse Resp B/P (MAP) Pulse Ox O2 Delivery O2 Flow Rate FiO2


 


3/5/18 07:34 98.3 89 20 121/73 (89) 97   


 


3/5/18 04:02 98.5 91 17 110/66 (81) 95   


 


3/5/18 04:00  88      


 


3/5/18 00:00  82      


 


3/4/18 22:14 98.3 77 18 117/74 (88) 99   


 


3/4/18 22:14        


 


3/4/18 20:00  102 18 113/72 (86) 98 Room Air  


 


3/4/18 18:18  112 20 123/72 (89) 99 Room Air  


 


3/4/18 17:52 98.9 131 17 121/69 (86) 100   














I/O      


 


 3/4/18 3/4/18 3/4/18 3/5/18 3/5/18 3/5/18





 07:00 15:00 23:00 07:00 15:00 23:00


 


Intake Total   35 ml   


 


Balance   35 ml   


 


      


 


Intake IV Total   35 ml   








Imaging





Last Impressions








Abdomen/Pelvis CT 3/4/18 2011 Signed





Impressions: 





 Service Date/Time:  Sunday, March 4, 2018 20:23 - CONCLUSION:  1. No acute 





 findings within the abdomen or pelvis. Small right renal cysts. Colonic 





 diverticula without evidence for diverticulitis.     Sabino Rinaldi MD 


 


Chest X-Ray 3/4/18 0000 Signed





Impressions: 





 Service Date/Time:  Sunday, March 4, 2018 22:05 - CONCLUSION: No acute 

disease.  





      Sabino Rinaldi MD 








Laboratory











Test


  3/4/18


18:40 3/5/18


05:55


 


White Blood Count 23.4 TH/MM3  13.8 TH/MM3 


 


Red Blood Count 3.87 MIL/MM3  3.43 MIL/MM3 


 


Hemoglobin 11.5 GM/DL  10.2 GM/DL 


 


Hematocrit 34.0 %  30.2 % 


 


Mean Corpuscular Volume 87.8 FL  88.2 FL 


 


Mean Corpuscular Hemoglobin 29.8 PG  29.8 PG 


 


Mean Corpuscular Hemoglobin


Concent 33.9 % 


  33.8 % 


 


 


Red Cell Distribution Width 12.7 %  12.7 % 


 


Platelet Count 333 TH/MM3  260 TH/MM3 


 


Mean Platelet Volume 9.0 FL  8.7 FL 


 


Neutrophils (%) (Auto) 81.9 %  71.5 % 


 


Lymphocytes (%) (Auto) 14.0 %  20.7 % 


 


Monocytes (%) (Auto) 3.9 %  6.1 % 


 


Eosinophils (%) (Auto) 0.1 %  1.4 % 


 


Basophils (%) (Auto) 0.1 %  0.3 % 


 


Neutrophils # (Auto) 19.2 TH/MM3  9.9 TH/MM3 


 


Lymphocytes # (Auto) 3.3 TH/MM3  2.9 TH/MM3 


 


Monocytes # (Auto) 0.9 TH/MM3  0.8 TH/MM3 


 


Eosinophils # (Auto) 0.0 TH/MM3  0.2 TH/MM3 


 


Basophils # (Auto) 0.0 TH/MM3  0.0 TH/MM3 


 


CBC Comment DIFF FINAL  DIFF FINAL 


 


Differential Comment    


 


Prothrombin Time 10.3 SEC  


 


Prothromb Time International


Ratio 1.0 RATIO 


  


 


 


Activated Partial


Thromboplast Time 21.8 SEC 


  


 


 


Blood Urea Nitrogen 36 MG/DL  28 MG/DL 


 


Creatinine 1.02 MG/DL  0.98 MG/DL 


 


Random Glucose 174 MG/DL  100 MG/DL 


 


Total Protein 6.9 GM/DL  6.3 GM/DL 


 


Albumin 3.5 GM/DL  3.4 GM/DL 


 


Calcium Level 8.6 MG/DL  8.0 MG/DL 


 


Alkaline Phosphatase 50 U/L  48 U/L 


 


Aspartate Amino Transf


(AST/SGOT) 14 U/L 


  18 U/L 


 


 


Alanine Aminotransferase


(ALT/SGPT) 17 U/L 


  19 U/L 


 


 


Total Bilirubin 0.2 MG/DL  0.5 MG/DL 


 


Sodium Level 142 MEQ/L  145 MEQ/L 


 


Potassium Level 4.1 MEQ/L  4.0 MEQ/L 


 


Chloride Level 109 MEQ/L  110 MEQ/L 


 


Carbon Dioxide Level 28.2 MEQ/L  26.5 MEQ/L 


 


Anion Gap 5 MEQ/L  9 MEQ/L 


 


Estimat Glomerular Filtration


Rate 76 ML/MIN 


  80 ML/MIN 


 


 


Troponin I


  LESS THAN 0.02


NG/ML 


 


 


Lipase 177 U/L  


 


Thyroid Stimulating Hormone


3rd Gen 1.340 uIU/ML 


  


 








Physical Examination


HEENT: PERRL; normocephalic; atraumatic; no jaundice.  


CHEST:  CTA


CARDIAC:  Regular rate and rhythm with no murmur gallop or rubs.


ABDOMEN:  Soft, nondistended, nontender; no hepatosplenomegaly; bowel sounds 

are present in all four quadrants.


EXTREMITIES: No clubbing, cyanosis, or edema.


SKIN:  Normal; no rash; no jaundice.


CNS:  No focal deficits; alert and oriented times three.


 (Grace Frederick)





Assessment and Plan


Plan


ASSESSMENT


- black tarry stool, symptomatic anemia - normocytic anemia.  drop in hgb 

overnight.  heme pos stool.  black tarry stool 5 days.


   never had EGD or colonoscopy.  prob UGIB


- leukocytosis - unclear etiology, CT abd unremarkable.  WBC trend down since 

yesterday





PLAN


- continue protonix gtt for now


- EGD today


- NPO


- obtain consent


- monitor labs


- colonoscopy as outpt


- further recs to follow


pt seen by myself and Dr Carlisle and this note is on his behalf


 (Grace Frederick)


Physician Comments


Seen and examined, plan as above, will proceed with EGD today.


Thank you for the consult.


 (Machelle Carlisle MD)











Grace Frederick Mar 5, 2018 09:12


Machelle Carlisle MD Mar 5, 2018 11:37

## 2018-03-05 NOTE — EKG
Date Performed: 03/04/2018       Time Performed: 18:05:39

 

PTAGE:      52 years

 

EKG:      SINUS TACHYCARDIA WITH SHORT WI INTERVAL MARKED LEFT AXIS DEVIATION NONSPECIFIC T-WAVE ABNO
RMALITY ABNORMAL ECG 

 

NO PREVIOUS TRACING            

 

DOCTOR:   Rashaun Vázquez  Interpretating Date/Time  03/05/2018 13:35:23

## 2018-03-05 NOTE — GIPROC
[facility]

[address] [facility]. [facility], [address]

 

 

EGD PROCEDURE REPORT     EXAM DATE: 03/05/2018

 

PATIENT NAME:      Layton Cope           MR #:      [id number]

YOB: 1965      VISIT #:     [id number]

ATTENDING:     Machelle Carlisle MD     ORDER #:     [id number]

ASSISTANT:      Ted Sanchez and Carli Zhou     STATUS:     inpatient

 

INDICATIONS:  The patient is a 52 yr old male here for an EGD due to melena

 

PROCEDURE PERFORMED:     EGD w/ biopsy

MEDICATIONS:     None and Per Anesthesia.

TOPICAL ANESTHETIC:     none

 

CONSENT: The patient understands the risks and benefits of the procedure and

understands that these risks include, but are not limited to: sedation,

allergic reaction, infection, perforation and/or bleeding. Alternative means of

evaluation and treatment include, among others: physical exam, x-rays, and/or

surgical intervention. The patient elects to proceed with this endoscopic

procedure.

 



medical equipment was checked for proper function. Hand hygiene and appropriate

measures for infection prevention was taken. After the risks, benefits and

alternatives of the procedure were thoroughly explained, Informed consent was

verified, confirmed and timeout was successfully executed by the treatment

team. The patient was anesthetized with topical anesthesia and the Pentax

[id number] endoscope was introduced through the mouth and advanced to the second

portion of the duodenum.  Retroflexion was performed and was normal  The

gastroscope was then slowly withdrawn and removed.

 

ESOPHAGUS: A 2 cm hiatal hernia was noted.

 

STOMACH: There was mild gastritis in the gastric antrum.  Multiple biopsies were

performed using cold forceps.  Sample sent for histology.

 

DUODENUM: A medium sized non-bleeding non-bleeding, deep, round and clean-based

ulcer was found in the 1st part of the duodenum.

 

 

 

ADVERSE EVENTS:     There were no complications.

IMPRESSIONS:     1.  2 cm hiatal hernia

2.  There was mild gastritis in the gastric antrum; multiple biopsies were

performed

3.  Medium sized non-bleeding ulcer was found in the 1st part of the duodenum

4.  Retroflexion was performed and was normal

 

RECOMMENDATIONS:     1.  Await biopsy results.  Biopsy results will not be ready

for 7-10 days.  If you don't hear from us in two weeks, call our office for

biopsy results.

2.  Continue PPI

3.  Avoid NSAIDS

PATIENT CONDITION:     stable

DISPOSITION:     Observation

REPEAT EXAM:     NONE

 

 

___________________________________

Machelle Carlisle MD

eSigned:  Machelle Carlisle MD 03/05/2018 11:50 AM

 

 

cc:

 

 

 

 

PATIENT NAME:  Layton Cope

MR#: [id number]